# Patient Record
Sex: FEMALE
[De-identification: names, ages, dates, MRNs, and addresses within clinical notes are randomized per-mention and may not be internally consistent; named-entity substitution may affect disease eponyms.]

---

## 2019-09-27 ENCOUNTER — HOSPITAL ENCOUNTER (EMERGENCY)
Dept: HOSPITAL 88 - ER | Age: 29
LOS: 1 days | Discharge: HOME | End: 2019-09-28
Payer: COMMERCIAL

## 2019-09-27 VITALS — HEIGHT: 61 IN | WEIGHT: 230 LBS | BODY MASS INDEX: 43.43 KG/M2

## 2019-09-27 DIAGNOSIS — M79.89: ICD-10-CM

## 2019-09-27 DIAGNOSIS — M79.662: Primary | ICD-10-CM

## 2019-09-27 DIAGNOSIS — R60.0: ICD-10-CM

## 2019-09-27 DIAGNOSIS — M79.661: ICD-10-CM

## 2019-09-27 PROCEDURE — 93970 EXTREMITY STUDY: CPT

## 2019-09-27 PROCEDURE — 93005 ELECTROCARDIOGRAM TRACING: CPT

## 2019-09-27 PROCEDURE — 99283 EMERGENCY DEPT VISIT LOW MDM: CPT

## 2019-09-27 NOTE — XMS REPORT
Patient Summary Document

                             Created on: 2019



LAINEY REYES

External Reference #: [id number]

: 1990

Sex: Female



Demographics







                          Address                   [address] [place] [place]  [address]

 

                          Home Phone                [phone]

 

                          Preferred Language        Unknown

 

                          Marital Status            Unknown

 

                          Quaker Affiliation     Unknown

 

                          Race                      Unknown

 

                                        Additional Race(s)  

 

                          Ethnic Group              Unknown





Author







                          Author                    [facility]

 

                          Address                   Unknown

 

                          Phone                     Unavailable







Support







                Name            Relationship    Address         Phone

 

                    TARAH REYES     PRS                 [address] [place]  [id number] [phone]

 

                    AMY, KEITH MARLENA     PRS                 [address] [place]  [address]                     [phone]







Care Team Providers







                    Care Team Member Name    Role                Phone

 

                    LESLY MARIA FERNANDA CASON    Unavailable         Unavailable

 

                    ALEXANDRA SINGER    Unavailable         Unavailable







Payers







             Payer Name    Policy Type    Policy Number    Effective Date    Expiration Date







Problems

This patient has no known problems.



Allergies, Adverse Reactions, Alerts







          Allergy Name    Allergy Type    Status    Severity    Reaction(s)    Onset Date    Inactive 

Date                      Treating Clinician        Comments

 

        No Known Allergies    DA      Active    U               2018-08-15 00:00:00                     







Medications

This patient has no known medications.



Results







           Test Description    Test Time    Test Comments    Text Results    Atomic Results    Result

 Comments

 

                BLOOD CULTURE    2017 00:00:00                      

 

   

 

                CULTURE (BEAKER) (test code=1095)    No growth in 5 days                     





URINE [id number] 09:49:00* 





                Test Item       Value           Reference Range    Comments

 

                CULTURE (BEAKER) (test code=1095)    >100,000 col/mL skin denise                     





CBC W/PLT COUNT & AUTO [id number] 08:20:00* 





                Test Item       Value           Reference Range    Comments

 

                WHITE BLOOD CELL COUNT (BEAKER) (test code=775)    4.7 K/ L        3.5-10.5         

 

                RED BLOOD CELL COUNT (BEAKER) (test code=761)    4.10 M/ L       3.93-5.22        

 

                HEMOGLOBIN (BEAKER) (test code=410)    10.6 GM/DL      11.2-15.7        

 

                HEMATOCRIT (BEAKER) (test code=411)    32.8 %          34.1-44.9        

 

                MEAN CORPUSCULAR VOLUME (BEAKER) (test code=753)    80.0 fL         79.4-94.8        

 

                MEAN CORPUSCULAR HEMOGLOBIN (BEAKER) (test code=751)    25.9 pg         25.6-32.2        

 

                    MEAN CORPUSCULAR HEMOGLOBIN CONC (BEAKER) (test code=752)    32.3 GM/DL          32.2-35.5

                                         

 

                RED CELL DISTRIBUTION WIDTH (BEAKER) (test code=412)    15.1 %          11.7-14.4        

 

                PLATELET COUNT (BEAKER) (test code=756)    162 K/CU MM     150-450          

 

                MEAN PLATELET VOLUME (BEAKER) (test code=754)    10.5 fL         9.4-12.3         

 

                NUCLEATED RED BLOOD CELLS (BEAKER) (test code=413)    0 /100 WBC      0-0              

 

                NEUTROPHILS RELATIVE PERCENT (BEAKER) (test code=429)    44 %                             

 

                LYMPHOCYTES RELATIVE PERCENT (BEAKER) (test code=430)    46 %                             

 

                MONOCYTES RELATIVE PERCENT (BEAKER) (test code=431)    8 %                              

 

                EOSINOPHILS RELATIVE PERCENT (BEAKER) (test code=432)    2 %                              

 

                BASOPHILS RELATIVE PERCENT (BEAKER) (test code=437)    0 %                              

 

                NEUTROPHILS ABSOLUTE COUNT (BEAKER) (test code=670)    2.09 K/ L       1.56-6.13        

 

                LYMPHOCYTES ABSOLUTE COUNT (BEAKER) (test code=414)    2.16 K/ L       1.18-3.74        

 

                MONOCYTES ABSOLUTE COUNT (BEAKER) (test code=415)    0.36 K/ L       0.24-0.36        

 

                EOSINOPHILS ABSOLUTE COUNT (BEAKER) (test code=416)    0.09 K/ L       0.04-0.36        

 

                BASOPHILS ABSOLUTE COUNT (BEAKER) (test code=417)    0.01 K/ L       0.01-0.08        

 

                IMMATURE GRANULOCYTES-RELATIVE PERCENT (BEAKER) (test code=2801)    0 %             0-1              





(MANUAL DIFFERENTIAL)2017 08:20:00* 





                Test Item       Value           Reference Range    Comments

 

                TOTAL COUNTED (BEAKER) (test code=1351)                                     

 

                WBC MORPHOLOGY (BEAKER) (test code=487)    Normal                           

 

                PLT MORPHOLOGY (BEAKER) (test code=486)    Normal                           

 

                RBC MORPHOLOGY (BEAKER) (test code=762)    Normal                           





BASIC METABOLIC [id number] 06:59:00* 





                Test Item       Value           Reference Range    Comments

 

                SODIUM (BEAKER) (test code=381)    138 meq/L       136-145          

 

                POTASSIUM (BEAKER) (test code=379)    3.7 meq/L       3.5-5.1          

 

                CHLORIDE (BEAKER) (test code=382)    111 meq/L                  

 

                CO2 (BEAKER) (test code=355)    20 meq/L        22-29            

 

                BLOOD UREA NITROGEN (BEAKER) (test code=354)    5 mg/dL         7-21             

 

                CREATININE (BEAKER) (test code=358)    0.72 mg/dL      0.57-1.25        

 

                GLUCOSE RANDOM (BEAKER) (test code=652)    105 mg/dL                  

 

                CALCIUM (BEAKER) (test code=697)    8.2 mg/dL       8.4-10.2         

 

                EGFR (BEAKER) (test code=1092)    98 mL/min/1.73 sq m                    ESTIMATED GFR IS NOT AS 

ACCURATE AS CREATININE CLEARANCE IN PREDICTING GLOMERULAR FILTRATION RATE. 
ESTIMATED GFR IS NOT APPLICABLE FOR DIALYSIS PATIENTS.





CBC W/PLT COUNT & AUTO [id number] 06:28:00* 





                Test Item       Value           Reference Range    Comments

 

                WHITE BLOOD CELL COUNT (BEAKER) (test code=775)    7.3 K/ L        3.5-10.5         

 

                RED BLOOD CELL COUNT (BEAKER) (test code=761)    4.49 M/ L       3.93-5.22        

 

                HEMOGLOBIN (BEAKER) (test code=410)    11.5 GM/DL      11.2-15.7        

 

                HEMATOCRIT (BEAKER) (test code=411)    37.0 %          34.1-44.9        

 

                MEAN CORPUSCULAR VOLUME (BEAKER) (test code=753)    82.4 fL         79.4-94.8        

 

                MEAN CORPUSCULAR HEMOGLOBIN (BEAKER) (test code=751)    25.6 pg         25.6-32.2        

 

                    MEAN CORPUSCULAR HEMOGLOBIN CONC (BEAKER) (test code=752)    31.1 GM/DL          32.2-35.5

                                         

 

                RED CELL DISTRIBUTION WIDTH (BEAKER) (test code=412)    15.3 %          11.7-14.4        

 

                PLATELET COUNT (BEAKER) (test code=756)    171 K/CU MM     150-450          

 

                MEAN PLATELET VOLUME (BEAKER) (test code=754)    10.9 fL         9.4-12.3         

 

                NUCLEATED RED BLOOD CELLS (BEAKER) (test code=413)    0 /100 WBC      0-0              

 

                NEUTROPHILS RELATIVE PERCENT (BEAKER) (test code=429)    73 %                             

 

                LYMPHOCYTES RELATIVE PERCENT (BEAKER) (test code=430)    18 %                             

 

                MONOCYTES RELATIVE PERCENT (BEAKER) (test code=431)    9 %                              

 

                EOSINOPHILS RELATIVE PERCENT (BEAKER) (test code=432)    1 %                              

 

                BASOPHILS RELATIVE PERCENT (BEAKER) (test code=437)    0 %                              

 

                NEUTROPHILS ABSOLUTE COUNT (BEAKER) (test code=670)    5.27 K/ L       1.56-6.13        

 

                LYMPHOCYTES ABSOLUTE COUNT (BEAKER) (test code=414)    1.28 K/ L       1.18-3.74        

 

                MONOCYTES ABSOLUTE COUNT (BEAKER) (test code=415)    0.63 K/ L       0.24-0.36        

 

                EOSINOPHILS ABSOLUTE COUNT (BEAKER) (test code=416)    0.04 K/ L       0.04-0.36        

 

                BASOPHILS ABSOLUTE COUNT (BEAKER) (test code=417)    0.01 K/ L       0.01-0.08        

 

                IMMATURE GRANULOCYTES-RELATIVE PERCENT (BEAKER) (test code=2801)    0 %             0-1              





BASIC METABOLIC [id number] 06:25:00* 





                Test Item       Value           Reference Range    Comments

 

                SODIUM (BEAKER) (test code=381)    141 meq/L       136-145          

 

                POTASSIUM (BEAKER) (test code=379)    3.8 meq/L       3.5-5.1          

 

                CHLORIDE (BEAKER) (test code=382)    114 meq/L                  

 

                CO2 (BEAKER) (test code=355)    18 meq/L        22-29            

 

                BLOOD UREA NITROGEN (BEAKER) (test code=354)    7 mg/dL         7-21             

 

                CREATININE (BEAKER) (test code=358)    0.79 mg/dL      0.57-1.25        

 

                GLUCOSE RANDOM (BEAKER) (test code=652)    102 mg/dL                  

 

                CALCIUM (BEAKER) (test code=697)    8.3 mg/dL       8.4-10.2         

 

                EGFR (BEAKER) (test code=1092)    88 mL/min/1.73 sq m                    ESTIMATED GFR IS NOT AS 

ACCURATE AS CREATININE CLEARANCE IN PREDICTING GLOMERULAR FILTRATION RATE. 
ESTIMATED GFR IS NOT APPLICABLE FOR DIALYSIS PATIENTS.





URINALYSIS W/ [id number] 12:43:00* 





                Test Item       Value           Reference Range    Comments

 

                COLOR (BEAKER) (test code=470)    Yellow                           

 

                CLARITY (BEAKER) (test code=469)    Hazy                             

 

                SPECIFIC GRAVITY UA (BEAKER) (test code=468)    1.016           1.001-1.035      

 

                PH UA (BEAKER) (test code=467)    6.5             5.0-8.0          

 

                PROTEIN UA (BEAKER) (test code=464)    200 mg/dL       Negative         

 

                GLUCOSE UA (BEAKER) (test code=365)    Negative        Negative         

 

                KETONES UA (BEAKER) (test code=371)    Negative        Negative         

 

                BILIRUBIN UA (BEAKER) (test code=462)    Negative        Negative         

 

                BLOOD UA (BEAKER) (test code=461)    Large           Negative         

 

                NITRITE UA (BEAKER) (test code=465)    Negative        Negative         

 

                LEUKOCYTE ESTERASE UA (BEAKER) (test code=466)    Large           Negative         

 

                UROBILINOGEN UA (BEAKER) (test code=463)    0.2 mg/dL       0.2-1.0          

 

                RBC UA (BEAKER) (test code=519)    1177 /HPF                        

 

                WBC UA (BEAKER) (test code=520)    167 /HPF                         

 

                MUCUS (BEAKER) (test code=1574)    Many                             

 

                SQUAMOUS EPITHELIAL (BEAKER) (test code=516)    1 /HPF                           

 

                SOURCE(BEAKER) (test code=2795)    Urine, Clean Catch                     





BASIC METABOLIC [id number] 11:33:00* 





                Test Item       Value           Reference Range    Comments

 

                SODIUM (BEAKER) (test code=381)    136 meq/L       136-145          

 

                POTASSIUM (BEAKER) (test code=379)    3.8 meq/L       3.5-5.1          

 

                CHLORIDE (BEAKER) (test code=382)    109 meq/L                  

 

                CO2 (BEAKER) (test code=355)    17 meq/L        22-29            

 

                BLOOD UREA NITROGEN (BEAKER) (test code=354)    9 mg/dL         7-21             

 

                CREATININE (BEAKER) (test code=358)    0.94 mg/dL      0.57-1.25        

 

                GLUCOSE RANDOM (BEAKER) (test code=652)    105 mg/dL                  

 

                CALCIUM (BEAKER) (test code=697)    8.5 mg/dL       8.4-10.2         

 

                EGFR (BEAKER) (test code=1092)    72 mL/min/1.73 sq m                    ESTIMATED GFR IS NOT AS 

ACCURATE AS CREATININE CLEARANCE IN PREDICTING GLOMERULAR FILTRATION RATE. 
ESTIMATED GFR IS NOT APPLICABLE FOR DIALYSIS PATIENTS.





CBC W/PLT COUNT & AUTO [id number] 11:29:00* 





                Test Item       Value           Reference Range    Comments

 

                WHITE BLOOD CELL COUNT (BEAKER) (test code=775)    8.5 K/ L        3.5-10.5         

 

                RED BLOOD CELL COUNT (BEAKER) (test code=761)    4.98 M/ L       3.93-5.22        

 

                HEMOGLOBIN (BEAKER) (test code=410)    12.9 GM/DL      11.2-15.7        

 

                HEMATOCRIT (BEAKER) (test code=411)    39.6 %          34.1-44.9        

 

                MEAN CORPUSCULAR VOLUME (BEAKER) (test code=753)    79.5 fL         79.4-94.8        

 

                MEAN CORPUSCULAR HEMOGLOBIN (BEAKER) (test code=751)    25.9 pg         25.6-32.2        

 

                    MEAN CORPUSCULAR HEMOGLOBIN CONC (BEAKER) (test code=752)    32.6 GM/DL          32.2-35.5

                                         

 

                RED CELL DISTRIBUTION WIDTH (BEAKER) (test code=412)    15.1 %          11.7-14.4        

 

                PLATELET COUNT (BEAKER) (test code=756)    190 K/CU MM     150-450          

 

                MEAN PLATELET VOLUME (BEAKER) (test code=754)    10.6 fL         9.4-12.3         

 

                NUCLEATED RED BLOOD CELLS (BEAKER) (test code=413)    0 /100 WBC      0-0              

 

                NEUTROPHILS RELATIVE PERCENT (BEAKER) (test code=429)    81 %                             

 

                LYMPHOCYTES RELATIVE PERCENT (BEAKER) (test code=430)    13 %                             

 

                MONOCYTES RELATIVE PERCENT (BEAKER) (test code=431)    5 %                              

 

                EOSINOPHILS RELATIVE PERCENT (BEAKER) (test code=432)    0 %                              

 

                BASOPHILS RELATIVE PERCENT (BEAKER) (test code=437)    0 %                              

 

                NEUTROPHILS ABSOLUTE COUNT (BEAKER) (test code=670)    6.86 K/ L       1.56-6.13        

 

                LYMPHOCYTES ABSOLUTE COUNT (BEAKER) (test code=414)    1.08 K/ L       1.18-3.74        

 

                MONOCYTES ABSOLUTE COUNT (BEAKER) (test code=415)    0.46 K/ L       0.24-0.36        

 

                EOSINOPHILS ABSOLUTE COUNT (BEAKER) (test code=416)    0.03 K/ L       0.04-0.36        

 

                BASOPHILS ABSOLUTE COUNT (BEAKER) (test code=417)    0.02 K/ L       0.01-0.08        

 

                IMMATURE GRANULOCYTES-RELATIVE PERCENT (BEAKER) (test code=2801)    0 %             0-1              





BLOOD [id number] 11:00:00* 





                Test Item       Value           Reference Range    Comments

 

                CULTURE (BEAKER) (test code=1095)    No growth in 5 days                     





BLOOD [id number] 11:00:00* 





                Test Item       Value           Reference Range    Comments

 

                CULTURE (BEAKER) (test code=1095)    No growth in 5 days                     





TISSUE [id number] 11:25:00* 





                Test Item       Value           Reference Range    Comments

 

                LAB AP CPT CODE (BEAKER) (test code=2749)    63712                            





URINE [id number] 10:47:00* 





                Test Item       Value           Reference Range    Comments

 

                CULTURE (BEAKER) (test code=1095)    PROTEUS MIRABILIS                    10-19,000 col/mL Proteus

 mirabilis

 

                Amikacin (test code=1)                                     

 

                Ampicillin + Sulbactam (test code=6)                                     

 

                Aztreonam (test code=32)                                     

 

                Cefepime (test code=51)                                     

 

                Cefoxitin (test code=68)                                     

 

                Ceftazidime (test code=27)                                     

 

                Ceftriaxone (test code=52)                                     

 

                Ertapenem (test code=38)                                     

 

                Gentamicin (test code=18)                                     

 

                Levofloxacin (test code=22)                                     

 

                Meropenem (test code=34)                                     

 

                Nitrofurantoin (test code=23)                                     

 

                Piperacillin + Tazobactam (test code=29)                                     

 

                Tetracycline (test code=2)                                     

 

                Tobramycin (test code=25)                                     

 

                Trimethoprim + Sulfamethoxazole (test code=47)                                     





CBC W/PLT COUNT & AUTO [id number] 10:00:00* 





                Test Item       Value           Reference Range    Comments

 

                WHITE BLOOD CELL COUNT (BEAKER) (test code=775)    4.2 K/ L        4.0-10.0         

 

                RED BLOOD CELL COUNT (BEAKER) (test code=761)    4.35 M/ L       4.00-5.00        

 

                HEMOGLOBIN (BEAKER) (test code=410)    10.8 GM/DL      12.0-15.0        

 

                HEMATOCRIT (BEAKER) (test code=411)    34.5 %          36.0-45.0        

 

                MEAN CORPUSCULAR VOLUME (BEAKER) (test code=753)    79.4 fL         82.0-99.0        

 

                MEAN CORPUSCULAR HEMOGLOBIN (BEAKER) (test code=751)    25.0 pg         27.0-33.0        

 

                    MEAN CORPUSCULAR HEMOGLOBIN CONC (BEAKER) (test code=752)    31.4 GM/DL          32.0-36.0

                                         

 

                RED CELL DISTRIBUTION WIDTH (BEAKER) (test code=412)    16.1 %          10.3-14.2        

 

                PLATELET COUNT (BEAKER) (test code=756)    219 K/CU MM     150-430          

 

                MEAN PLATELET VOLUME (BEAKER) (test code=754)    8.8 fL          6.5-10.5         

 

                NUCLEATED RED BLOOD CELLS (BEAKER) (test code=413)    0 /100 WBC      0-0              

 

                NEUTROPHILS RELATIVE PERCENT (BEAKER) (test code=429)    29 %                             

 

                LYMPHOCYTES RELATIVE PERCENT (BEAKER) (test code=430)    57 %                             

 

                MONOCYTES RELATIVE PERCENT (BEAKER) (test code=431)    7 %                              

 

                EOSINOPHILS RELATIVE PERCENT (BEAKER) (test code=432)    7 %                              

 

                BASOPHILS RELATIVE PERCENT (BEAKER) (test code=437)    0 %                              

 

                NEUTROPHILS ABSOLUTE COUNT (BEAKER) (test code=670)    1.22 K/ L       1.80-8.00        

 

                LYMPHOCYTES ABSOLUTE COUNT (BEAKER) (test code=414)    2.37 K/ L       1.48-4.50        

 

                MONOCYTES ABSOLUTE COUNT (BEAKER) (test code=415)    0.30 K/ L       0.00-1.30        

 

                EOSINOPHILS ABSOLUTE COUNT (BEAKER) (test code=416)    0.27 K/ L       0.00-0.50        

 

                BASOPHILS ABSOLUTE COUNT (BEAKER) (test code=417)    0.01 K/ L       0.00-0.20        





0.00(MANUAL DIFFERENTIAL)2017 10:00:00* 





                Test Item       Value           Reference Range    Comments

 

                TOTAL COUNTED (BEAKER) (test code=1351)                                     

 

                WBC MORPHOLOGY (BEAKER) (test code=487)    Normal                           

 

                PLT MORPHOLOGY (BEAKER) (test code=486)    Normal                           

 

                RBC MORPHOLOGY (BEAKER) (test code=762)    Normal                           





COMPREHENSIVE METABOLIC [id number] 06:17:00* 





                Test Item       Value           Reference Range    Comments

 

                TOTAL PROTEIN (BEAKER) (test code=770)    6.8 gm/dL       6.0-8.3          

 

                ALBUMIN (BEAKER) (test code=1145)    3.6 g/dL        3.5-5.0          

 

                ALKALINE PHOSPHATASE (BEAKER) (test code=346)    227 U/L                    

 

                BILIRUBIN TOTAL (BEAKER) (test code=377)    0.6 mg/dL       0.2-1.2          

 

                SODIUM (BEAKER) (test code=381)    141 meq/L       136-145          

 

                POTASSIUM (BEAKER) (test code=379)    4.0 meq/L       3.5-5.1          

 

                CHLORIDE (BEAKER) (test code=382)    112 meq/L                  

 

                CO2 (BEAKER) (test code=355)    20 meq/L        22-29            

 

                BLOOD UREA NITROGEN (BEAKER) (test code=354)    6 mg/dL         7-21             

 

                CREATININE (BEAKER) (test code=358)    0.79 mg/dL      0.57-1.25        

 

                GLUCOSE RANDOM (BEAKER) (test code=652)    78 mg/dL                   

 

                CALCIUM (BEAKER) (test code=697)    8.6 mg/dL       8.4-10.2         

 

                AST (SGOT) (BEAKER) (test code=353)    52 U/L          5-34             

 

                ALT (SGPT) (BEAKER) (test code=347)    152 U/L         6-55             

 

                EGFR (BEAKER) (test code=1092)    89 mL/min/1.73 sq m                    ESTIMATED GFR IS NOT AS 

ACCURATE AS CREATININE CLEARANCE IN PREDICTING GLOMERULAR FILTRATION RATE. 
ESTIMATED GFR IS NOT APPLICABLE FOR DIALYSIS PATIENTS.





PT/[id number] 06:03:00* 





                Test Item       Value           Reference Range    Comments

 

                PROTIME (BEAKER) (test code=759)    13.1 seconds    11.7-14.7        

 

                INR (BEAKER) (test code=370)    1.0             <=5.9            

 

                PARTIAL THROMBOPLASTIN TIME (BEAKER) (test code=760)    34.3 seconds    22.5-36.0        







RECOMMENDED COUMADIN/WARFARIN INR THERAPY RANGESSTANDARD DOSE: 2.0 - 3.0   Inclu
jaspal: PROPHYLAXIS for venous thrombosis, systemic embolization; TREATMENT for ozzy
ous thrombosis and/or pulmonary embolus.HIGH RISK: Target INR is 2.5-3.5 for pat
ients with mechanical heart valves.CBC W/PLT COUNT & AUTO [id number]
08:12:00* 





                Test Item       Value           Reference Range    Comments

 

                WHITE BLOOD CELL COUNT (BEAKER) (test code=775)    5.3 K/ L        4.0-10.0         

 

                RED BLOOD CELL COUNT (BEAKER) (test code=761)    4.47 M/ L       4.00-5.00        

 

                HEMOGLOBIN (BEAKER) (test code=410)    11.3 GM/DL      12.0-15.0        

 

                HEMATOCRIT (BEAKER) (test code=411)    34.6 %          36.0-45.0        

 

                MEAN CORPUSCULAR VOLUME (BEAKER) (test code=753)    77.5 fL         82.0-99.0        

 

                MEAN CORPUSCULAR HEMOGLOBIN (BEAKER) (test code=751)    25.3 pg         27.0-33.0        

 

                    MEAN CORPUSCULAR HEMOGLOBIN CONC (BEAKER) (test code=752)    32.6 GM/DL          32.0-36.0

                                         

 

                RED CELL DISTRIBUTION WIDTH (BEAKER) (test code=412)    15.9 %          10.3-14.2        

 

                PLATELET COUNT (BEAKER) (test code=756)    231 K/CU MM     150-430          

 

                MEAN PLATELET VOLUME (BEAKER) (test code=754)    8.6 fL          6.5-10.5         

 

                NUCLEATED RED BLOOD CELLS (BEAKER) (test code=413)    0 /100 WBC      0-0              

 

                NEUTROPHILS RELATIVE PERCENT (BEAKER) (test code=429)    38 %                             

 

                LYMPHOCYTES RELATIVE PERCENT (BEAKER) (test code=430)    47 %                             

 

                MONOCYTES RELATIVE PERCENT (BEAKER) (test code=431)    6 %                              

 

                EOSINOPHILS RELATIVE PERCENT (BEAKER) (test code=432)    9 %                              

 

                BASOPHILS RELATIVE PERCENT (BEAKER) (test code=437)    0 %                              

 

                NEUTROPHILS ABSOLUTE COUNT (BEAKER) (test code=670)    2.01 K/ L       1.80-8.00        

 

                LYMPHOCYTES ABSOLUTE COUNT (BEAKER) (test code=414)    2.49 K/ L       1.48-4.50        

 

                MONOCYTES ABSOLUTE COUNT (BEAKER) (test code=415)    0.31 K/ L       0.00-1.30        

 

                EOSINOPHILS ABSOLUTE COUNT (BEAKER) (test code=416)    0.48 K/ L       0.00-0.50        

 

                BASOPHILS ABSOLUTE COUNT (BEAKER) (test code=417)    0.01 K/ L       0.00-0.20        





0.00(MANUAL DIFFERENTIAL)2017 08:12:00* 





                Test Item       Value           Reference Range    Comments

 

                TOTAL COUNTED (BEAKER) (test code=1351)                                     

 

                WBC MORPHOLOGY (BEAKER) (test code=487)    Normal                           

 

                PLT MORPHOLOGY (BEAKER) (test code=486)    Normal                           

 

                RBC MORPHOLOGY (BEAKER) (test code=762)    Normal                           





COMPREHENSIVE METABOLIC [id number] 06:25:00* 





                Test Item       Value           Reference Range    Comments

 

                TOTAL PROTEIN (BEAKER) (test code=770)    7.1 gm/dL       6.0-8.3          

 

                ALBUMIN (BEAKER) (test code=1145)    3.9 g/dL        3.5-5.0          

 

                ALKALINE PHOSPHATASE (BEAKER) (test code=346)    289 U/L                    

 

                BILIRUBIN TOTAL (BEAKER) (test code=377)    0.6 mg/dL       0.2-1.2          

 

                SODIUM (BEAKER) (test code=381)    139 meq/L       136-145          

 

                POTASSIUM (BEAKER) (test code=379)    3.6 meq/L       3.5-5.1          

 

                CHLORIDE (BEAKER) (test code=382)    109 meq/L                  

 

                CO2 (BEAKER) (test code=355)    19 meq/L        22-29            

 

                BLOOD UREA NITROGEN (BEAKER) (test code=354)    11 mg/dL        7-21             

 

                CREATININE (BEAKER) (test code=358)    0.79 mg/dL      0.57-1.25        

 

                GLUCOSE RANDOM (BEAKER) (test code=652)    86 mg/dL                   

 

                CALCIUM (BEAKER) (test code=697)    8.7 mg/dL       8.4-10.2         

 

                AST (SGOT) (BEAKER) (test code=353)    110 U/L         5-34             

 

                ALT (SGPT) (BEAKER) (test code=347)    236 U/L         6-55             

 

                EGFR (BEAKER) (test code=1092)     mL/min/1.73 sq m                    INSUFFICIENT CLINICAL DATA

 TO CALCULATE ESTIMATED GFR.





[id number] 06:22:00* 





                Test Item       Value           Reference Range    Comments

 

                LIPASE (BEAKER) (test code=749)    29 U/L          8-78             





[id number] 06:22:00* 





                Test Item       Value           Reference Range    Comments

 

                AMYLASE (BEAKER) (test code=349)    65 U/L                     





BASIC METABOLIC [id number] 21:15:00* 





                Test Item       Value           Reference Range    Comments

 

                SODIUM (BEAKER) (test code=381)    138 meq/L       136-145          

 

                POTASSIUM (BEAKER) (test code=379)    3.6 meq/L       3.5-5.1          

 

                CHLORIDE (BEAKER) (test code=382)    107 meq/L                  

 

                CO2 (BEAKER) (test code=355)    19 meq/L        22-29            

 

                BLOOD UREA NITROGEN (BEAKER) (test code=354)    12 mg/dL        7-21             

 

                CREATININE (BEAKER) (test code=358)    1.01 mg/dL      0.57-1.25        

 

                GLUCOSE RANDOM (BEAKER) (test code=652)    81 mg/dL                   

 

                CALCIUM (BEAKER) (test code=697)    9.2 mg/dL       8.4-10.2         

 

                EGFR (BEAKER) (test code=1092)     mL/min/1.73 sq m                    INSUFFICIENT CLINICAL DATA

 TO CALCULATE ESTIMATED GFR.





[id number] 21:10:00* 





                Test Item       Value           Reference Range    Comments

 

                LIPASE (BEAKER) (test code=749)    39 U/L          8-78             





[id number] 21:10:00* 





                Test Item       Value           Reference Range    Comments

 

                AMYLASE (BEAKER) (test code=349)    68 U/L                     





HEPATIC FUNCTION [id number] 21:10:00* 





                Test Item       Value           Reference Range    Comments

 

                TOTAL PROTEIN (BEAKER) (test code=770)    7.8 gm/dL       6.0-8.3          

 

                ALBUMIN (BEAKER) (test code=1145)    4.3 g/dL        3.5-5.0          

 

                BILIRUBIN TOTAL (BEAKER) (test code=377)    0.8 mg/dL       0.2-1.2          

 

                BILIRUBIN DIRECT (BEAKER) (test code=706)    0.4 mg/dL       0.1-0.5          

 

                ALKALINE PHOSPHATASE (BEAKER) (test code=346)    343 U/L                    

 

                AST (SGOT) (BEAKER) (test code=353)    170 U/L         5-34             

 

                ALT (SGPT) (BEAKER) (test code=347)    297 U/L         6-55             





URINALYSIS W/ [id number] 21:01:00* 





                Test Item       Value           Reference Range    Comments

 

                COLOR (BEAKER) (test code=470)    Yellow                           

 

                CLARITY (BEAKER) (test code=469)    Clear                            

 

                SPECIFIC GRAVITY UA (BEAKER) (test code=468)    1.012           1.001-1.035      

 

                PH UA (BEAKER) (test code=467)    6.5             5.0-8.0          

 

                PROTEIN UA (BEAKER) (test code=464)    10 mg/dL        Negative         

 

                GLUCOSE UA (BEAKER) (test code=365)    Negative        Negative         

 

                KETONES UA (BEAKER) (test code=371)    Negative        Negative         

 

                BILIRUBIN UA (BEAKER) (test code=462)    Negative        Negative         

 

                BLOOD UA (BEAKER) (test code=461)    Negative        Negative         

 

                NITRITE UA (BEAKER) (test code=465)    Negative        Negative         

 

                LEUKOCYTE ESTERASE UA (BEAKER) (test code=466)    Large           Negative         

 

                UROBILINOGEN UA (BEAKER) (test code=463)    0.2 mg/dL       0.2-1.0          

 

                RBC UA (BEAKER) (test code=519)    1 /HPF                           

 

                WBC UA (BEAKER) (test code=520)    24 /HPF                          

 

                MUCUS (BEAKER) (test code=1574)    Few                              

 

                SQUAMOUS EPITHELIAL (BEAKER) (test code=516)    2 /HPF                           

 

                SOURCE(BEAKER) (test code=2795)    Urine, Clean Catch                     





CBC W/PLT COUNT & AUTO [id number] 20:58:00* 





                Test Item       Value           Reference Range    Comments

 

                WHITE BLOOD CELL COUNT (BEAKER) (test code=775)    7.1 K/ L        4.0-10.0         

 

                RED BLOOD CELL COUNT (BEAKER) (test code=761)    4.71 M/ L       4.00-5.00        

 

                HEMOGLOBIN (BEAKER) (test code=410)    11.9 GM/DL      12.0-15.0        

 

                HEMATOCRIT (BEAKER) (test code=411)    37.2 %          36.0-45.0        

 

                MEAN CORPUSCULAR VOLUME (BEAKER) (test code=753)    79.0 fL         82.0-99.0        

 

                MEAN CORPUSCULAR HEMOGLOBIN (BEAKER) (test code=751)    25.3 pg         27.0-33.0        

 

                    MEAN CORPUSCULAR HEMOGLOBIN CONC (BEAKER) (test code=752)    32.0 GM/DL          32.0-36.0

                                         

 

                RED CELL DISTRIBUTION WIDTH (BEAKER) (test code=412)    17.2 %          10.3-14.2        

 

                PLATELET COUNT (BEAKER) (test code=756)    247 K/CU MM     150-430          

 

                MEAN PLATELET VOLUME (BEAKER) (test code=754)    8.8 fL          6.5-10.5         

 

                NUCLEATED RED BLOOD CELLS (BEAKER) (test code=413)    0 /100 WBC      0-0              

 

                NEUTROPHILS RELATIVE PERCENT (BEAKER) (test code=429)    55 %                             

 

                LYMPHOCYTES RELATIVE PERCENT (BEAKER) (test code=430)    35 %                             

 

                MONOCYTES RELATIVE PERCENT (BEAKER) (test code=431)    5 %                              

 

                EOSINOPHILS RELATIVE PERCENT (BEAKER) (test code=432)    5 %                              

 

                BASOPHILS RELATIVE PERCENT (BEAKER) (test code=437)    0 %                              

 

                NEUTROPHILS ABSOLUTE COUNT (BEAKER) (test code=670)    3.89 K/ L       1.80-8.00        

 

                LYMPHOCYTES ABSOLUTE COUNT (BEAKER) (test code=414)    2.46 K/ L       1.48-4.50        

 

                MONOCYTES ABSOLUTE COUNT (BEAKER) (test code=415)    0.33 K/ L       0.00-1.30        

 

                EOSINOPHILS ABSOLUTE COUNT (BEAKER) (test code=416)    0.39 K/ L       0.00-0.50        

 

                BASOPHILS ABSOLUTE COUNT (BEAKER) (test code=417)    0.00 K/ L       0.00-0.20        





0.00PREGNANCY SCREEN, [id number] 20:55:00* 





                Test Item       Value           Reference Range    Comments

 

                PREGNANCY TEST URINE (BEAKER) (test code=583)    Negative

## 2019-09-28 VITALS — SYSTOLIC BLOOD PRESSURE: 117 MMHG | DIASTOLIC BLOOD PRESSURE: 80 MMHG

## 2021-01-14 NOTE — XMS REPORT
Clinical Summary

                             Created on: 2019



Kathleen Torres

External Reference #: [id number]

: 1990

Sex: Female



Demographics







                          Address                   [address]  [address]

 

                          Home Phone                [phone]

 

                          Preferred Language        English

 

                          Marital Status            Unknown

 

                          Voodoo Affiliation     Evangelical

 

                          Race                      White

 

                          Ethnic Group              /Latin





Author







                          Author                    [facility]

 

                          Address                   Unknown

 

                          Phone                     Unavailable







Support







                Name            Relationship    Address         Phone

 

                    Radha Torres        [employer]                [address]  [address]                     [phone]







Care Team Providers







                    Care Team Member Name    Role                Phone

 

                    Chavez Presley      PCP                 Unavailable







Allergies

No Known Allergies



Medications







                          End Date                  Status



              Medication     Sig          Dispensed     Refills      Start  



                                         Date  

 

                                                    Active



                     omeprazole (PRILOSEC) 20     Take 20 mg by       0   



                           MG capsule                mouth daily.     

 

                                                    Active



                     TRAMADOL HCL (TRAMADOL     Take by             0   



                           ORAL)                     mouth.     

 

                                                    Active



                     IBUPROFEN ORAL      Take by             0   



                                         mouth.     







Active Problems







 



                           Problem                   Noted Date

 

 



                           Hydronephrosis, unspecified hydronephrosis type     2017

 

 



                           Flank pain                2017

 

 



                           Acute cystitis without hematuria     2017

 

 



                           Left ureter dilated       2017

 

 



                           Right ureter dilated      2017

 

 



                           Left nephrolithiasis      2017

 

 



                           Bilateral hydronephrosis     2017

 

 



                           Gallstones                2017

 

 



                           Choledocholithiasis       2017







Family History







   



                 Medical History     Relation        Name            Comments

 

   



                           Diabetes                  Maternal  



                                         Grandmother  

 

   



                           Heart disease             Maternal  



                                         Grandmother  

 

   



                           Diabetes                  Maternal  



                                         Uncle  

 

   



                           Asthma                    Mother  

 

   



                           Hyperlipidemia            Mother  

 

   



                           Hypertension              Paternal  



                                         Grandmother  









   



                 Relation        Name            Status          Comments

 

   



                                         Maternal Grandmother   

 

   



                                         Maternal Uncle   

 

   



                                         Mother   

 

   



                                         Paternal Grandmother   







Social History







                                        Date



                 Tobacco Use     Types           Packs/Day       Years Used 

 

                                        Quit: 2011



                 Former Smoker     Cigarettes      0.5             5 

 

    



                           Smokeless Tobacco:        Chew  



                                         Current User   









   



                 Alcohol Use     Drinks/Week     oz/Week         Comments

 

   



                                         No   









 



                           Sex Assigned at Birth     Date Recorded

 

 



                                         Not on file 









                                        Industry



                           Job Start Date            Occupation 

 

                                        Not on file



                           Not on file               Not on file 









                                        Travel End



                           Travel History            Travel Start 

 





                                         No recent travel history available.







Last Filed Vital Signs

Not on file



Plan of Treatment





Not on file



Results

Not on fileafter 2018



Insurance







     



            Payer      Benefit     Subscriber ID     Type       Phone      Address



                                         Plan /    



                                         Group    

 

     



                 [employer]     [employer]           xxxxxxxxxxx     HMO/POS  



                                         [employer]    



                                         [facility]    









     



            Guarantor Name     Account     Relation to     Date of     Phone      Billing Address



                     Type                Patient             Birth  

 

     



            Kathleen Torres     Personal/F     Self       1990     [phone]     [address]



                     [facility]               (Home)              [place] [address]







Advance Directives





For more information, please contact:



[facility]



[address]



[place] [address] [phone]









                          Date Inactivated          Comments



                           Code Status               Date Activated  

 

                          2017 11:23 AM         



                           Full Code                 2017  7:50 PM  









  



                           This code status was determined by:     Patient 









                                                     

 

                          2017  8:34 PM          



                           Full Code                 2017  5:51 AM  









  



                           This code status was determined by:     Patient Detail Level: Detailed Quality 402: Tobacco Use And Help With Quitting Among Adolescents: Patient screened for tobacco and never smoked Quality 130: Documentation Of Current Medications In The Medical Record: Current Medications Documented Quality 110: Preventive Care And Screening: Influenza Immunization: Influenza Immunization Administered during Influenza season